# Patient Record
Sex: MALE | Race: ASIAN | URBAN - METROPOLITAN AREA
[De-identification: names, ages, dates, MRNs, and addresses within clinical notes are randomized per-mention and may not be internally consistent; named-entity substitution may affect disease eponyms.]

---

## 2017-01-31 ENCOUNTER — CARE COORDINATION (OUTPATIENT)
Dept: GERIATRIC MEDICINE | Facility: CLINIC | Age: 77
End: 2017-01-31

## 2017-04-04 ENCOUNTER — TELEPHONE (OUTPATIENT)
Dept: OPHTHALMOLOGY | Facility: CLINIC | Age: 77
End: 2017-04-04

## 2017-04-04 DIAGNOSIS — H40.1132 PRIMARY OPEN ANGLE GLAUCOMA OF BOTH EYES, MODERATE STAGE: Primary | ICD-10-CM

## 2017-04-04 RX ORDER — LATANOPROST 50 UG/ML
1 SOLUTION/ DROPS OPHTHALMIC AT BEDTIME
Qty: 3 BOTTLE | Refills: 4 | Status: SHIPPED | OUTPATIENT
Start: 2017-04-04

## 2017-04-04 NOTE — TELEPHONE ENCOUNTER
Having difficulty obtaining Latanoprost in [facility].  Will give Rx to fill here to daughter.  Osvaldo Morgan M.D.

## 2017-06-28 ENCOUNTER — CARE COORDINATION (OUTPATIENT)
Dept: GERIATRIC MEDICINE | Facility: CLINIC | Age: 77
End: 2017-06-28

## 2017-06-28 NOTE — PROGRESS NOTES
CM contacted client to set up annual Health Risk Assessment visit.  Writer spoke to daughter, Funmilayo, at client s request.  Client declined annual home visit this year.  Funmilayo reports there has been little change in client s memory since last year, and his spouse is always with him.  Funmilayo informed writer that client last saw PCP on 10/13/16 for his Preventive visit and had his eye exam on 8/19/2016.  He is taking his medications as prescribed and visits PCP as needed.  Writer informed client and Funmilayo that in the event that client decides to participate in the face to face assessment to contact writer.   Client agrees and states she understands.      Writer reviewed the following with client:   ER visits: No  Hospitalizations: No  TCU stays: No  Significant health status changes: No  Falls/Injuries: No  ADL/IADL changes: No  Changes in services: No    Caregiver Assessment follow up:  NA    Goals: See POC in chart for goal progress documentation.      Refusal of visit POC completed.  CMS instructed to enter refusal of visit into MMIS and writer mailed refusal of visit letter to client.    Follow-Up Plan:  CM to f/u with client with a 6 month telephone assessment.  Contact information shared with client and family, encouraged client to call with any questions or concerns.    Scottie Chung RN, PHN  [facility]   Phone: [phone]  Fax [phone]  [email or web address]

## 2017-06-29 ENCOUNTER — CARE COORDINATION (OUTPATIENT)
Dept: GERIATRIC MEDICINE | Facility: CLINIC | Age: 77
End: 2017-06-29

## 2017-06-29 NOTE — PROGRESS NOTES
"Per CC, mailed client a \"Refusal of Home Visit\" letter.  MMIS entry.      Kimi Logan  Case Management Specialist  [facility]   [phone]      "

## 2017-10-02 ENCOUNTER — CARE COORDINATION (OUTPATIENT)
Dept: GERIATRIC MEDICINE | Facility: CLINIC | Age: 77
End: 2017-10-02

## 2017-11-27 NOTE — PROGRESS NOTES
LATE NOTE ENTRY FOR 10/2/2017    Client and member aware:    Scottie Jones, Man 1940 [facility] [phone] Not in Access, currently enrolled with are MA inactive on 6/30/17 and [employer] will term 9/30/17. Disenrolled.      Scottie Chung RN, PHN  [facility]   Phone: [phone]  Fax [phone]  [email or web address]

## 2017-11-29 ENCOUNTER — CARE COORDINATION (OUTPATIENT)
Dept: GERIATRIC MEDICINE | Facility: CLINIC | Age: 77
End: 2017-11-29

## 2017-11-29 NOTE — PROGRESS NOTES
Writer t/c to client to f/u on MA status.  According to son-in-law, client moved to live in [place] permanently.      Client's chart reviewed, disenrollment check-list completed, chart handed off to CMS to process.    Scottie Chung RN, PHN  [facility]   Phone: [phone]  Fax [phone]  [email or web address]

## 2019-01-02 ENCOUNTER — DOCUMENTATION ONLY (OUTPATIENT)
Dept: OPHTHALMOLOGY | Facility: CLINIC | Age: 79
End: 2019-01-02